# Patient Record
Sex: MALE | Race: WHITE | NOT HISPANIC OR LATINO | Employment: OTHER | URBAN - METROPOLITAN AREA
[De-identification: names, ages, dates, MRNs, and addresses within clinical notes are randomized per-mention and may not be internally consistent; named-entity substitution may affect disease eponyms.]

---

## 2018-09-18 ENCOUNTER — CONVERSION ENCOUNTER (OUTPATIENT)
Dept: PAIN MEDICINE | Facility: CLINIC | Age: 58
End: 2018-09-18

## 2018-09-18 ENCOUNTER — HOSPITAL ENCOUNTER (OUTPATIENT)
Dept: PAIN MEDICINE | Facility: HOSPITAL | Age: 58
Discharge: HOME | End: 2018-09-18
Attending: PHYSICAL MEDICINE & REHABILITATION | Admitting: PHYSICAL MEDICINE & REHABILITATION

## 2018-09-18 LAB
AMPHETAMINES UR QL SCN: NEGATIVE
BARBITURATES UR QL SCN: NEGATIVE
BENZODIAZ UR QL SCN: NEGATIVE
BZE UR QL SCN: NEGATIVE
CREAT 24H UR-MCNC: NORMAL MG/DL
METHADONE UR QL SCN: NEGATIVE
OPIATE CONFIRMATION URINE: NORMAL
OPIATES TESTED UR SCN: NEGATIVE
PCP UR QL: NEGATIVE
THC SERPLBLD CFM-MCNC: NEGATIVE NG/ML

## 2018-10-18 ENCOUNTER — HOSPITAL ENCOUNTER (OUTPATIENT)
Dept: PAIN MEDICINE | Facility: HOSPITAL | Age: 58
Discharge: HOME | End: 2018-10-18
Attending: PHYSICAL MEDICINE & REHABILITATION | Admitting: PHYSICAL MEDICINE & REHABILITATION

## 2018-10-18 ENCOUNTER — CONVERSION ENCOUNTER (OUTPATIENT)
Dept: PAIN MEDICINE | Facility: CLINIC | Age: 58
End: 2018-10-18

## 2018-12-18 ENCOUNTER — HOSPITAL ENCOUNTER (OUTPATIENT)
Dept: PAIN MEDICINE | Facility: HOSPITAL | Age: 58
Discharge: HOME | End: 2018-12-18
Attending: PHYSICAL MEDICINE & REHABILITATION | Admitting: PHYSICAL MEDICINE & REHABILITATION

## 2018-12-18 ENCOUNTER — CONVERSION ENCOUNTER (OUTPATIENT)
Dept: PAIN MEDICINE | Facility: CLINIC | Age: 58
End: 2018-12-18

## 2019-03-19 ENCOUNTER — CONVERSION ENCOUNTER (OUTPATIENT)
Dept: PAIN MEDICINE | Facility: CLINIC | Age: 59
End: 2019-03-19

## 2019-03-19 ENCOUNTER — HOSPITAL ENCOUNTER (OUTPATIENT)
Dept: PAIN MEDICINE | Facility: HOSPITAL | Age: 59
Discharge: HOME | End: 2019-03-19
Attending: PHYSICAL MEDICINE & REHABILITATION | Admitting: PHYSICAL MEDICINE & REHABILITATION

## 2019-06-04 VITALS
RESPIRATION RATE: 16 BRPM | DIASTOLIC BLOOD PRESSURE: 71 MMHG | WEIGHT: 214 LBS | SYSTOLIC BLOOD PRESSURE: 111 MMHG | BODY MASS INDEX: 29.96 KG/M2 | RESPIRATION RATE: 16 BRPM | OXYGEN SATURATION: 98 % | OXYGEN SATURATION: 97 % | BODY MASS INDEX: 32.2 KG/M2 | WEIGHT: 224 LBS | HEIGHT: 71 IN | WEIGHT: 225 LBS | DIASTOLIC BLOOD PRESSURE: 75 MMHG | BODY MASS INDEX: 31.5 KG/M2 | DIASTOLIC BLOOD PRESSURE: 79 MMHG | SYSTOLIC BLOOD PRESSURE: 123 MMHG | OXYGEN SATURATION: 96 % | HEART RATE: 69 BPM | OXYGEN SATURATION: 98 % | RESPIRATION RATE: 16 BRPM | HEART RATE: 62 BPM | DIASTOLIC BLOOD PRESSURE: 35 MMHG | HEIGHT: 71 IN | RESPIRATION RATE: 16 BRPM | HEIGHT: 71 IN | WEIGHT: 230 LBS | SYSTOLIC BLOOD PRESSURE: 91 MMHG | BODY MASS INDEX: 31.36 KG/M2 | HEART RATE: 78 BPM | SYSTOLIC BLOOD PRESSURE: 103 MMHG | HEIGHT: 71 IN | HEART RATE: 67 BPM

## 2019-06-18 ENCOUNTER — OFFICE VISIT (OUTPATIENT)
Dept: PAIN MEDICINE | Facility: CLINIC | Age: 59
End: 2019-06-18

## 2019-06-18 VITALS
TEMPERATURE: 97.6 F | HEIGHT: 70 IN | HEART RATE: 75 BPM | DIASTOLIC BLOOD PRESSURE: 92 MMHG | RESPIRATION RATE: 16 BRPM | OXYGEN SATURATION: 97 % | WEIGHT: 230 LBS | BODY MASS INDEX: 32.93 KG/M2 | SYSTOLIC BLOOD PRESSURE: 133 MMHG

## 2019-06-18 DIAGNOSIS — M54.2 NECK PAIN, CHRONIC: ICD-10-CM

## 2019-06-18 DIAGNOSIS — G89.29 CHRONIC LEFT SHOULDER PAIN: ICD-10-CM

## 2019-06-18 DIAGNOSIS — G89.29 CHRONIC MIDLINE LOW BACK PAIN WITHOUT SCIATICA: Primary | ICD-10-CM

## 2019-06-18 DIAGNOSIS — G89.29 NECK PAIN, CHRONIC: ICD-10-CM

## 2019-06-18 DIAGNOSIS — M25.512 CHRONIC LEFT SHOULDER PAIN: ICD-10-CM

## 2019-06-18 DIAGNOSIS — Z79.899 OTHER LONG TERM (CURRENT) DRUG THERAPY: ICD-10-CM

## 2019-06-18 DIAGNOSIS — M54.50 CHRONIC MIDLINE LOW BACK PAIN WITHOUT SCIATICA: Primary | ICD-10-CM

## 2019-06-18 PROCEDURE — G0463 HOSPITAL OUTPT CLINIC VISIT: HCPCS | Performed by: PHYSICAL MEDICINE & REHABILITATION

## 2019-06-18 PROCEDURE — 99214 OFFICE O/P EST MOD 30 MIN: CPT | Performed by: PHYSICAL MEDICINE & REHABILITATION

## 2019-06-18 RX ORDER — ASPIRIN 81 MG/1
81 TABLET ORAL DAILY
COMMUNITY
End: 2019-08-20 | Stop reason: ALTCHOICE

## 2019-06-18 RX ORDER — ATORVASTATIN CALCIUM 80 MG/1
80 TABLET, FILM COATED ORAL DAILY
Refills: 5 | COMMUNITY
Start: 2019-03-14

## 2019-06-18 RX ORDER — FLUOXETINE HYDROCHLORIDE 20 MG/1
20 CAPSULE ORAL
COMMUNITY

## 2019-06-18 RX ORDER — PREGABALIN 150 MG/1
75 CAPSULE ORAL
COMMUNITY

## 2019-06-18 RX ORDER — LEVOTHYROXINE SODIUM 175 UG/1
TABLET ORAL EVERY 24 HOURS
COMMUNITY
Start: 2014-06-13

## 2019-06-18 RX ORDER — NITROGLYCERIN 0.1MG/HR
PATCH, TRANSDERMAL 24 HOURS TRANSDERMAL
COMMUNITY
Start: 2018-09-18 | End: 2019-08-20 | Stop reason: ALTCHOICE

## 2019-06-18 RX ORDER — HYDROCODONE BITARTRATE AND ACETAMINOPHEN 7.5; 325 MG/1; MG/1
1 TABLET ORAL EVERY 6 HOURS PRN
Refills: 0 | COMMUNITY
Start: 2019-05-23 | End: 2019-08-20 | Stop reason: ALTCHOICE

## 2019-06-18 RX ORDER — AMLODIPINE BESYLATE 5 MG/1
5 TABLET ORAL DAILY
COMMUNITY

## 2019-06-18 RX ORDER — RANOLAZINE 1000 MG/1
TABLET, EXTENDED RELEASE ORAL
COMMUNITY
Start: 2018-09-18

## 2019-06-18 RX ORDER — TIZANIDINE 4 MG/1
4 TABLET ORAL 2 TIMES DAILY
Refills: 3 | COMMUNITY
Start: 2019-03-14

## 2019-06-18 RX ORDER — TRAZODONE HYDROCHLORIDE 100 MG/1
TABLET ORAL
Refills: 1 | COMMUNITY
Start: 2019-03-14

## 2019-06-18 NOTE — PROGRESS NOTES
Subjective   Khoa Hogan is a 58 y.o. male.     Pain in neck, back, L shoulder, 6/10 at worst, best, and today, stabbing, tingling, worse with all activity and lying down, interferes with sleep, failed PT, massage, ESIs, has TENS and LSO. Failed Norco 5/325mg, 7.5/325mg TID, was doing well at this dosage at QID prn. MRIs at [facility] in [place]. Prior notes with PCP reviewed, as above, also saw [facility], discharged in good standing, referred to continue pain management, saw Dr. Briggs in past. LBP worsening, no other change in symptoms.         The following portions of the patient's history were reviewed and updated as appropriate: allergies, current medications, past family history, past medical history, past social history, past surgical history and problem list.    Review of Systems   Constitutional: Negative for chills, fatigue and fever.   HENT: Positive for hearing loss.    Eyes: Positive for visual disturbance.   Respiratory: Positive for shortness of breath.    Cardiovascular: Positive for chest pain.   Gastrointestinal: Negative for abdominal pain, constipation, diarrhea, nausea and vomiting.   Genitourinary: Negative for urinary incontinence.   Musculoskeletal: Positive for arthralgias, back pain and neck pain. Negative for joint swelling and myalgias.   Neurological: Positive for dizziness and headache. Negative for weakness and numbness.       Objective   Physical Exam   Constitutional: He is oriented to person, place, and time. He appears well-developed and well-nourished.   HENT:   Head: Normocephalic and atraumatic.   Eyes: EOM are normal. Pupils are equal, round, and reactive to light.   Neck: Normal range of motion.   Cardiovascular: Normal rate, regular rhythm, normal heart sounds and intact distal pulses.   Pulmonary/Chest: Breath sounds normal.   Abdominal: Soft. Bowel sounds are normal. He exhibits no distension. There is no tenderness.   Neurological: He is alert and oriented to person, place,  and time. He has normal strength and normal reflexes. No sensory deficit.   Psychiatric: He has a normal mood and affect. His behavior is normal. Thought content normal.         Assessment/Plan   Khoa was seen today for back pain.    Diagnoses and all orders for this visit:    Chronic midline low back pain without sciatica    Neck pain, chronic    Chronic left shoulder pain    Other long term (current) drug therapy        INspect reviewed, in order. High risk per SOAPP. UDS 9/18/18 in order.  Restarted Norco 7.5/325mg QID prn, insufficient for worsening pain, increase to 10mg QID prn, may reduce next visit if possible. Failed TID prn, 5/325mg.  Cont LSO for truncal stability. Cont TENS, helps quite a bit.  Failed LESIs in past.  MRI L-spine with significant pathology.  RTC 2 months for f/u.

## 2019-08-20 ENCOUNTER — OFFICE VISIT (OUTPATIENT)
Dept: PAIN MEDICINE | Facility: CLINIC | Age: 59
End: 2019-08-20

## 2019-08-20 VITALS
OXYGEN SATURATION: 98 % | TEMPERATURE: 98.5 F | WEIGHT: 230 LBS | DIASTOLIC BLOOD PRESSURE: 78 MMHG | SYSTOLIC BLOOD PRESSURE: 116 MMHG | HEIGHT: 70 IN | HEART RATE: 82 BPM | BODY MASS INDEX: 32.93 KG/M2 | RESPIRATION RATE: 16 BRPM

## 2019-08-20 DIAGNOSIS — F19.90 CURRENT DRUG USE: Primary | ICD-10-CM

## 2019-08-20 DIAGNOSIS — M25.512 CHRONIC LEFT SHOULDER PAIN: ICD-10-CM

## 2019-08-20 DIAGNOSIS — G89.29 CHRONIC MIDLINE LOW BACK PAIN WITHOUT SCIATICA: Primary | ICD-10-CM

## 2019-08-20 DIAGNOSIS — M54.50 CHRONIC MIDLINE LOW BACK PAIN WITHOUT SCIATICA: Primary | ICD-10-CM

## 2019-08-20 DIAGNOSIS — G89.29 NECK PAIN, CHRONIC: ICD-10-CM

## 2019-08-20 DIAGNOSIS — G89.29 CHRONIC LEFT SHOULDER PAIN: ICD-10-CM

## 2019-08-20 DIAGNOSIS — Z79.899 OTHER LONG TERM (CURRENT) DRUG THERAPY: ICD-10-CM

## 2019-08-20 DIAGNOSIS — M54.2 NECK PAIN, CHRONIC: ICD-10-CM

## 2019-08-20 PROCEDURE — 99213 OFFICE O/P EST LOW 20 MIN: CPT | Performed by: PHYSICAL MEDICINE & REHABILITATION

## 2019-08-20 PROCEDURE — G0463 HOSPITAL OUTPT CLINIC VISIT: HCPCS | Performed by: PHYSICAL MEDICINE & REHABILITATION

## 2019-08-20 RX ORDER — ISOSORBIDE MONONITRATE 30 MG/1
30 TABLET, EXTENDED RELEASE ORAL
COMMUNITY

## 2019-08-20 RX ORDER — METOPROLOL SUCCINATE 50 MG/1
50 TABLET, EXTENDED RELEASE ORAL DAILY
COMMUNITY

## 2019-08-20 RX ORDER — CLOPIDOGREL BISULFATE 75 MG/1
75 TABLET ORAL DAILY
COMMUNITY

## 2019-08-20 RX ORDER — HYDROCODONE BITARTRATE AND ACETAMINOPHEN 10; 325 MG/1; MG/1
1 TABLET ORAL EVERY 6 HOURS PRN
Refills: 0 | COMMUNITY
Start: 2019-07-22 | End: 2019-08-20 | Stop reason: SDUPTHER

## 2019-08-20 RX ORDER — HYDROCODONE BITARTRATE AND ACETAMINOPHEN 10; 325 MG/1; MG/1
1 TABLET ORAL EVERY 6 HOURS PRN
Qty: 120 TABLET | Refills: 0 | Status: SHIPPED | OUTPATIENT
Start: 2019-08-20 | End: 2019-11-19 | Stop reason: SDUPTHER

## 2019-08-20 RX ORDER — HYDROCODONE BITARTRATE AND ACETAMINOPHEN 10; 325 MG/1; MG/1
1 TABLET ORAL EVERY 6 HOURS PRN
Qty: 120 TABLET | Refills: 0 | Status: SHIPPED | OUTPATIENT
Start: 2019-08-20 | End: 2019-08-20 | Stop reason: SDUPTHER

## 2019-08-20 RX ORDER — ACYCLOVIR 800 MG/1
TABLET ORAL
Refills: 0 | COMMUNITY
Start: 2019-07-10

## 2019-08-20 RX ORDER — ASPIRIN 325 MG
325 TABLET ORAL DAILY
COMMUNITY

## 2019-08-20 NOTE — PROGRESS NOTES
Subjective   Khoa Hogan is a 58 y.o. male.     Pain in neck, back, L shoulder, 6/10 at worst, best, and today, stabbing, tingling, worse with all activity and lying down, interferes with sleep, failed PT, massage, ESIs, has TENS and LSO. Failed Norco 5/325mg, 7.5/325mg TID, was doing well at this dosage at QID prn. MRIs at [facility] in [place]. Prior notes with PCP reviewed, as above, also saw [facility], discharged in good standing, referred to continue pain management, saw Dr. Briggs in past. LBP worsening, no other change in lumbar symptoms. Had 2 stents placed, used home meds inpt.         The following portions of the patient's history were reviewed and updated as appropriate: allergies, current medications, past family history, past medical history, past social history, past surgical history and problem list.    Review of Systems   Constitutional: Negative for chills, fatigue and fever.   HENT: Positive for hearing loss.    Eyes: Positive for visual disturbance.   Respiratory: Positive for shortness of breath.    Cardiovascular: Positive for chest pain.   Gastrointestinal: Negative for abdominal pain, constipation, diarrhea, nausea and vomiting.   Genitourinary: Negative for urinary incontinence.   Musculoskeletal: Positive for arthralgias, back pain and neck pain. Negative for joint swelling and myalgias.   Neurological: Positive for dizziness and headache. Negative for weakness and numbness.       Objective   Physical Exam   Constitutional: He is oriented to person, place, and time. He appears well-developed and well-nourished.   HENT:   Head: Normocephalic and atraumatic.   Eyes: EOM are normal. Pupils are equal, round, and reactive to light.   Neck: Normal range of motion.   Cardiovascular: Normal rate, regular rhythm, normal heart sounds and intact distal pulses.   Pulmonary/Chest: Breath sounds normal.   Abdominal: Soft. Bowel sounds are normal. He exhibits no distension. There is no tenderness.    Neurological: He is alert and oriented to person, place, and time. He has normal strength and normal reflexes. No sensory deficit.   Psychiatric: He has a normal mood and affect. His behavior is normal. Thought content normal.         Assessment/Plan   Khoa was seen today for back pain.    Diagnoses and all orders for this visit:    Chronic midline low back pain without sciatica    Neck pain, chronic    Chronic left shoulder pain    Other long term (current) drug therapy        INspect reviewed, in order. High risk per SOAPP. UDS 9/18/18 in order.  Restarted Norco 7.5/325mg QID prn, insufficient for worsening pain, increased to 10mg QID prn, continue for now. Failed TID prn, 5/325mg.  Cont LSO for truncal stability. Cont TENS, helps quite a bit.  Failed LESIs in past.  MRI L-spine with significant pathology.  RTC 3 months for f/u.

## 2019-11-19 ENCOUNTER — OFFICE VISIT (OUTPATIENT)
Dept: PAIN MEDICINE | Facility: CLINIC | Age: 59
End: 2019-11-19

## 2019-11-19 VITALS
WEIGHT: 237 LBS | DIASTOLIC BLOOD PRESSURE: 85 MMHG | RESPIRATION RATE: 16 BRPM | HEART RATE: 83 BPM | HEIGHT: 71 IN | SYSTOLIC BLOOD PRESSURE: 128 MMHG | TEMPERATURE: 97.8 F | BODY MASS INDEX: 33.18 KG/M2 | OXYGEN SATURATION: 97 %

## 2019-11-19 DIAGNOSIS — M54.2 NECK PAIN, CHRONIC: ICD-10-CM

## 2019-11-19 DIAGNOSIS — M54.50 CHRONIC MIDLINE LOW BACK PAIN WITHOUT SCIATICA: Primary | ICD-10-CM

## 2019-11-19 DIAGNOSIS — G89.29 NECK PAIN, CHRONIC: ICD-10-CM

## 2019-11-19 DIAGNOSIS — M25.512 CHRONIC LEFT SHOULDER PAIN: ICD-10-CM

## 2019-11-19 DIAGNOSIS — G89.29 CHRONIC LEFT SHOULDER PAIN: ICD-10-CM

## 2019-11-19 DIAGNOSIS — G89.29 CHRONIC MIDLINE LOW BACK PAIN WITHOUT SCIATICA: Primary | ICD-10-CM

## 2019-11-19 DIAGNOSIS — Z79.899 OTHER LONG TERM (CURRENT) DRUG THERAPY: ICD-10-CM

## 2019-11-19 PROCEDURE — G0463 HOSPITAL OUTPT CLINIC VISIT: HCPCS | Performed by: PHYSICAL MEDICINE & REHABILITATION

## 2019-11-19 PROCEDURE — 99213 OFFICE O/P EST LOW 20 MIN: CPT | Performed by: PHYSICAL MEDICINE & REHABILITATION

## 2019-11-19 RX ORDER — DEXLANSOPRAZOLE 60 MG/1
CAPSULE, DELAYED RELEASE ORAL
COMMUNITY

## 2019-11-19 RX ORDER — HYDROCODONE BITARTRATE AND ACETAMINOPHEN 10; 325 MG/1; MG/1
1 TABLET ORAL EVERY 6 HOURS PRN
Qty: 120 TABLET | Refills: 0 | Status: SHIPPED | OUTPATIENT
Start: 2019-11-19 | End: 2019-11-19 | Stop reason: SDUPTHER

## 2019-11-19 RX ORDER — TAMSULOSIN HYDROCHLORIDE 0.4 MG/1
CAPSULE ORAL
COMMUNITY
Start: 2012-07-11

## 2019-11-19 RX ORDER — HYDROCODONE BITARTRATE AND ACETAMINOPHEN 10; 325 MG/1; MG/1
1 TABLET ORAL EVERY 6 HOURS PRN
Qty: 120 TABLET | Refills: 0 | Status: SHIPPED | OUTPATIENT
Start: 2019-11-19 | End: 2020-02-11 | Stop reason: SDUPTHER

## 2019-11-19 RX ORDER — LEVOTHYROXINE SODIUM 200 MCG
200 TABLET ORAL DAILY
Refills: 1 | COMMUNITY
Start: 2019-09-03

## 2019-11-19 NOTE — PROGRESS NOTES
Subjective   Khoa Hogan is a 59 y.o. male.     Pain in neck, back, L shoulder, 6/10 at worst, best, and today, stabbing, tingling, worse with all activity and lying down, interferes with sleep, failed PT, massage, ESIs, has TENS and LSO. Failed Norco 5/325mg, 7.5/325mg TID, was doing well at this dosage at QID prn. MRIs at [facility] in [place]. Prior notes with PCP reviewed, as above, also saw [facility], discharged in good standing, referred to continue pain management, saw Dr. Briggs in past. LBP worsening, no other change in lumbar symptoms. Had 2 stents placed, used home meds inpt.         The following portions of the patient's history were reviewed and updated as appropriate: allergies, current medications, past family history, past medical history, past social history, past surgical history and problem list.    Review of Systems   Constitutional: Negative for chills, fatigue and fever.   HENT: Positive for hearing loss.    Eyes: Positive for visual disturbance.   Respiratory: Positive for shortness of breath.    Cardiovascular: Positive for chest pain.   Gastrointestinal: Negative for abdominal pain, constipation, diarrhea, nausea and vomiting.   Genitourinary: Negative for urinary incontinence.   Musculoskeletal: Positive for arthralgias, back pain and neck pain. Negative for joint swelling and myalgias.   Neurological: Positive for dizziness and headache. Negative for weakness and numbness.       Objective   Physical Exam   Constitutional: He is oriented to person, place, and time. He appears well-developed and well-nourished.   HENT:   Head: Normocephalic and atraumatic.   Eyes: EOM are normal. Pupils are equal, round, and reactive to light.   Neck: Normal range of motion.   Cardiovascular: Normal rate, regular rhythm, normal heart sounds and intact distal pulses.   Pulmonary/Chest: Breath sounds normal.   Abdominal: Soft. Bowel sounds are normal. He exhibits no distension. There is no tenderness.    Neurological: He is alert and oriented to person, place, and time. He has normal strength and normal reflexes. No sensory deficit.   Psychiatric: He has a normal mood and affect. His behavior is normal. Thought content normal.         Assessment/Plan   Khoa was seen today for back pain.    Diagnoses and all orders for this visit:    Chronic midline low back pain without sciatica    Neck pain, chronic    Chronic left shoulder pain    Other long term (current) drug therapy        INspect reviewed, in order. High risk per SOAPP. UDS 8/20/19 in order.  Restarted Norco 7.5/325mg QID prn, insufficient for worsening pain, increased to 10mg QID prn, continue for now. Failed TID prn, 5/325mg.  Cont LSO for truncal stability. Cont TENS, helps quite a bit.  Failed LESIs in past.  MRI L-spine with significant pathology.  RTC 3 months for f/u.

## 2020-02-11 ENCOUNTER — OFFICE VISIT (OUTPATIENT)
Dept: PAIN MEDICINE | Facility: CLINIC | Age: 60
End: 2020-02-11

## 2020-02-11 VITALS
SYSTOLIC BLOOD PRESSURE: 143 MMHG | BODY MASS INDEX: 34.16 KG/M2 | OXYGEN SATURATION: 98 % | RESPIRATION RATE: 16 BRPM | WEIGHT: 244 LBS | TEMPERATURE: 97.9 F | HEIGHT: 71 IN | HEART RATE: 74 BPM | DIASTOLIC BLOOD PRESSURE: 84 MMHG

## 2020-02-11 DIAGNOSIS — G89.29 CHRONIC LEFT SHOULDER PAIN: ICD-10-CM

## 2020-02-11 DIAGNOSIS — M54.2 NECK PAIN, CHRONIC: ICD-10-CM

## 2020-02-11 DIAGNOSIS — M54.50 CHRONIC MIDLINE LOW BACK PAIN WITHOUT SCIATICA: Primary | ICD-10-CM

## 2020-02-11 DIAGNOSIS — G89.29 CHRONIC MIDLINE LOW BACK PAIN WITHOUT SCIATICA: Primary | ICD-10-CM

## 2020-02-11 DIAGNOSIS — Z79.899 OTHER LONG TERM (CURRENT) DRUG THERAPY: ICD-10-CM

## 2020-02-11 DIAGNOSIS — G89.29 NECK PAIN, CHRONIC: ICD-10-CM

## 2020-02-11 DIAGNOSIS — M25.512 CHRONIC LEFT SHOULDER PAIN: ICD-10-CM

## 2020-02-11 PROCEDURE — G0463 HOSPITAL OUTPT CLINIC VISIT: HCPCS | Performed by: PHYSICAL MEDICINE & REHABILITATION

## 2020-02-11 PROCEDURE — 99213 OFFICE O/P EST LOW 20 MIN: CPT | Performed by: PHYSICAL MEDICINE & REHABILITATION

## 2020-02-11 RX ORDER — HYDROCODONE BITARTRATE AND ACETAMINOPHEN 10; 325 MG/1; MG/1
1 TABLET ORAL EVERY 6 HOURS PRN
Qty: 120 TABLET | Refills: 0 | Status: SHIPPED | OUTPATIENT
Start: 2020-02-11 | End: 2020-05-14 | Stop reason: SDUPTHER

## 2020-02-11 RX ORDER — NITROGLYCERIN 2.5 MG/1
CAPSULE ORAL
COMMUNITY

## 2020-02-11 RX ORDER — HYDROCODONE BITARTRATE AND ACETAMINOPHEN 10; 325 MG/1; MG/1
1 TABLET ORAL EVERY 6 HOURS PRN
Qty: 120 TABLET | Refills: 0 | Status: SHIPPED | OUTPATIENT
Start: 2020-02-11 | End: 2020-02-11 | Stop reason: SDUPTHER

## 2020-02-11 NOTE — PROGRESS NOTES
Subjective   Khoa Hogna Jr. is a 59 y.o. male.     Pain in neck, back, L shoulder, 6/10 at worst, best, and today, stabbing, tingling, worse with all activity and lying down, interferes with sleep, failed PT, massage, ESIs, has TENS and LSO. Failed Norco 5/325mg, 7.5/325mg TID, was doing well at this dosage at QID prn. MRIs at [facility] in [place]. Prior notes with PCP reviewed, as above, also saw [facility], discharged in good standing, referred to continue pain management, saw Dr. Briggs in past. LBP worsening, no other change in lumbar symptoms. Had 2 stents placed, used home meds inpt.       The following portions of the patient's history were reviewed and updated as appropriate: allergies, current medications, past family history, past medical history, past social history, past surgical history and problem list.    Review of Systems   Constitutional: Negative for chills, fatigue and fever.   HENT: Positive for hearing loss.    Eyes: Positive for visual disturbance.   Respiratory: Positive for shortness of breath.    Cardiovascular: Positive for chest pain.   Gastrointestinal: Negative for abdominal pain, constipation, diarrhea, nausea and vomiting.   Genitourinary: Negative for urinary incontinence.   Musculoskeletal: Positive for arthralgias, back pain and neck pain. Negative for joint swelling and myalgias.   Neurological: Positive for dizziness and headache. Negative for weakness and numbness.       Objective   Physical Exam   Constitutional: He is oriented to person, place, and time. He appears well-developed and well-nourished.   HENT:   Head: Normocephalic and atraumatic.   Eyes: Pupils are equal, round, and reactive to light. EOM are normal.   Neck: Normal range of motion.   Cardiovascular: Normal rate, regular rhythm, normal heart sounds and intact distal pulses.   Pulmonary/Chest: Breath sounds normal.   Abdominal: Soft. Bowel sounds are normal. He exhibits no distension. There is no tenderness.    Neurological: He is alert and oriented to person, place, and time. He has normal strength and normal reflexes. No sensory deficit.   Psychiatric: He has a normal mood and affect. His behavior is normal. Thought content normal.         Assessment/Plan   Khoa was seen today for back pain.    Diagnoses and all orders for this visit:    Chronic midline low back pain without sciatica    Neck pain, chronic    Chronic left shoulder pain    Other long term (current) drug therapy        INspect reviewed, in order. High risk per SOAPP. UDS 8/20/19 in order.  Restarted Norco 7.5/325mg QID prn, insufficient for worsening pain, increased to 10mg QID prn, continue for now. Failed 10mg TID prn, 5/325mg.  Cont LSO for truncal stability. Cont TENS, helps quite a bit.  Failed LESIs in past.  MRI L-spine with significant pathology.  RTC 3 months for f/u.

## 2020-05-14 ENCOUNTER — OFFICE VISIT (OUTPATIENT)
Dept: PAIN MEDICINE | Facility: CLINIC | Age: 60
End: 2020-05-14

## 2020-05-14 VITALS
DIASTOLIC BLOOD PRESSURE: 59 MMHG | BODY MASS INDEX: 35.07 KG/M2 | HEART RATE: 64 BPM | WEIGHT: 245 LBS | HEIGHT: 70 IN | TEMPERATURE: 97.1 F | SYSTOLIC BLOOD PRESSURE: 93 MMHG | RESPIRATION RATE: 16 BRPM | OXYGEN SATURATION: 98 %

## 2020-05-14 DIAGNOSIS — M25.512 CHRONIC LEFT SHOULDER PAIN: ICD-10-CM

## 2020-05-14 DIAGNOSIS — Z79.899 OTHER LONG TERM (CURRENT) DRUG THERAPY: ICD-10-CM

## 2020-05-14 DIAGNOSIS — G89.29 NECK PAIN, CHRONIC: ICD-10-CM

## 2020-05-14 DIAGNOSIS — G89.29 CHRONIC MIDLINE LOW BACK PAIN WITHOUT SCIATICA: Primary | ICD-10-CM

## 2020-05-14 DIAGNOSIS — M54.2 NECK PAIN, CHRONIC: ICD-10-CM

## 2020-05-14 DIAGNOSIS — M54.50 CHRONIC MIDLINE LOW BACK PAIN WITHOUT SCIATICA: Primary | ICD-10-CM

## 2020-05-14 DIAGNOSIS — G89.29 CHRONIC LEFT SHOULDER PAIN: ICD-10-CM

## 2020-05-14 PROCEDURE — G0463 HOSPITAL OUTPT CLINIC VISIT: HCPCS | Performed by: PHYSICAL MEDICINE & REHABILITATION

## 2020-05-14 PROCEDURE — 99213 OFFICE O/P EST LOW 20 MIN: CPT | Performed by: PHYSICAL MEDICINE & REHABILITATION

## 2020-05-14 RX ORDER — HYDROCODONE BITARTRATE AND ACETAMINOPHEN 10; 325 MG/1; MG/1
1 TABLET ORAL EVERY 6 HOURS PRN
Qty: 120 TABLET | Refills: 0 | Status: SHIPPED | OUTPATIENT
Start: 2020-05-14 | End: 2020-08-13 | Stop reason: SDUPTHER

## 2020-05-14 RX ORDER — HYDROCODONE BITARTRATE AND ACETAMINOPHEN 10; 325 MG/1; MG/1
1 TABLET ORAL EVERY 6 HOURS PRN
Qty: 120 TABLET | Refills: 0 | Status: SHIPPED | OUTPATIENT
Start: 2020-05-14 | End: 2020-05-14 | Stop reason: SDUPTHER

## 2020-05-14 RX ORDER — CEPHALEXIN 500 MG/1
500 CAPSULE ORAL 2 TIMES DAILY
COMMUNITY
Start: 2020-03-17

## 2020-05-14 RX ORDER — CYCLOBENZAPRINE HCL 10 MG
10 TABLET ORAL 3 TIMES DAILY PRN
COMMUNITY
Start: 2020-03-19

## 2020-05-14 NOTE — PROGRESS NOTES
Subjective   Khoa Hogan Jr. is a 59 y.o. male.     Pain in neck, back, L shoulder, 6/10 at worst, best, and today, stabbing, tingling, worse with all activity and lying down, interferes with sleep, failed PT, massage, ESIs, has TENS and LSO. Failed Norco 5/325mg, 7.5/325mg TID, was doing well at this dosage at QID prn. MRIs at [facility] in [place]. Prior notes with PCP reviewed, as above, also saw [facility], discharged in good standing, referred to continue pain management, saw Dr. Briggs in past. LBP worsening, no other change in lumbar symptoms. Had 2 stents placed, used home meds inpt.       The following portions of the patient's history were reviewed and updated as appropriate: allergies, current medications, past family history, past medical history, past social history, past surgical history and problem list.    Review of Systems   Constitutional: Negative for chills, fatigue and fever.   HENT: Positive for hearing loss.    Eyes: Positive for visual disturbance.   Respiratory: Positive for shortness of breath.    Cardiovascular: Positive for chest pain.   Gastrointestinal: Negative for abdominal pain, constipation, diarrhea, nausea and vomiting.   Genitourinary: Negative for urinary incontinence.   Musculoskeletal: Positive for arthralgias, back pain and neck pain. Negative for joint swelling and myalgias.   Neurological: Positive for dizziness and headache. Negative for weakness and numbness.       Objective   Physical Exam   Constitutional: He is oriented to person, place, and time. He appears well-developed and well-nourished.   HENT:   Head: Normocephalic and atraumatic.   Eyes: Pupils are equal, round, and reactive to light. EOM are normal.   Neck: Normal range of motion.   Cardiovascular: Normal rate, regular rhythm, normal heart sounds and intact distal pulses.   Pulmonary/Chest: Breath sounds normal.   Abdominal: Soft. Bowel sounds are normal. He exhibits no distension. There is no tenderness.    Neurological: He is alert and oriented to person, place, and time. He has normal strength and normal reflexes. No sensory deficit.   Psychiatric: He has a normal mood and affect. His behavior is normal. Thought content normal.         Assessment/Plan   Khoa was seen today for back pain.    Diagnoses and all orders for this visit:    Chronic midline low back pain without sciatica    Neck pain, chronic    Chronic left shoulder pain    Other long term (current) drug therapy        INspect reviewed, in order. High risk per SOAPP. UDS 8/20/19 in order.  Restarted Norco 7.5/325mg QID prn, insufficient for worsening pain, increased to 10mg QID prn, continue for now. Failed 10mg TID prn, 5/325mg. Doing well, denies side effects.  Cont LSO for truncal stability. Cont TENS, helps quite a bit.  Failed LESIs in past.  MRI L-spine with significant pathology.  RTC 3 months for f/u.

## 2020-08-13 ENCOUNTER — OFFICE VISIT (OUTPATIENT)
Dept: PAIN MEDICINE | Facility: CLINIC | Age: 60
End: 2020-08-13

## 2020-08-13 ENCOUNTER — RESULTS ENCOUNTER (OUTPATIENT)
Dept: PAIN MEDICINE | Facility: CLINIC | Age: 60
End: 2020-08-13

## 2020-08-13 VITALS
OXYGEN SATURATION: 98 % | TEMPERATURE: 98 F | WEIGHT: 230 LBS | DIASTOLIC BLOOD PRESSURE: 80 MMHG | RESPIRATION RATE: 16 BRPM | BODY MASS INDEX: 32.93 KG/M2 | SYSTOLIC BLOOD PRESSURE: 120 MMHG | HEIGHT: 70 IN | HEART RATE: 96 BPM

## 2020-08-13 DIAGNOSIS — M54.50 CHRONIC MIDLINE LOW BACK PAIN WITHOUT SCIATICA: Primary | ICD-10-CM

## 2020-08-13 DIAGNOSIS — F19.90 CURRENT DRUG USE: ICD-10-CM

## 2020-08-13 DIAGNOSIS — M25.512 CHRONIC LEFT SHOULDER PAIN: ICD-10-CM

## 2020-08-13 DIAGNOSIS — Z79.899 OTHER LONG TERM (CURRENT) DRUG THERAPY: ICD-10-CM

## 2020-08-13 DIAGNOSIS — G89.29 NECK PAIN, CHRONIC: ICD-10-CM

## 2020-08-13 DIAGNOSIS — M54.2 NECK PAIN, CHRONIC: ICD-10-CM

## 2020-08-13 DIAGNOSIS — G89.29 CHRONIC LEFT SHOULDER PAIN: ICD-10-CM

## 2020-08-13 DIAGNOSIS — G89.29 CHRONIC MIDLINE LOW BACK PAIN WITHOUT SCIATICA: Primary | ICD-10-CM

## 2020-08-13 DIAGNOSIS — F19.90 CURRENT DRUG USE: Primary | ICD-10-CM

## 2020-08-13 PROCEDURE — 99213 OFFICE O/P EST LOW 20 MIN: CPT | Performed by: PHYSICAL MEDICINE & REHABILITATION

## 2020-08-13 PROCEDURE — G0463 HOSPITAL OUTPT CLINIC VISIT: HCPCS | Performed by: PHYSICAL MEDICINE & REHABILITATION

## 2020-08-13 RX ORDER — HYDROCODONE BITARTRATE AND ACETAMINOPHEN 10; 325 MG/1; MG/1
1 TABLET ORAL EVERY 6 HOURS PRN
Qty: 120 TABLET | Refills: 0 | Status: SHIPPED | OUTPATIENT
Start: 2020-08-13 | End: 2020-08-13 | Stop reason: SDUPTHER

## 2020-08-13 RX ORDER — DOXYCYCLINE HYCLATE 100 MG/1
CAPSULE ORAL
COMMUNITY

## 2020-08-13 RX ORDER — HYDROCODONE BITARTRATE AND ACETAMINOPHEN 10; 325 MG/1; MG/1
1 TABLET ORAL EVERY 6 HOURS PRN
Qty: 120 TABLET | Refills: 0 | Status: SHIPPED | OUTPATIENT
Start: 2020-08-13

## 2020-08-13 NOTE — PROGRESS NOTES
Subjective   Khoa Hogan Jr. is a 59 y.o. male.     Pain in neck, back, L shoulder, 6/10 at worst, best, and today, stabbing, tingling, worse with all activity and lying down, interferes with sleep, failed PT, massage, ESIs, has TENS and LSO. Failed Norco 5/325mg, 7.5/325mg TID, was doing well at this dosage at QID prn. MRIs at [facility] in [place]. Prior notes with PCP reviewed, as above, also saw [facility], discharged in good standing, referred to continue pain management, saw Dr. Briggs in past. LBP worsening, no other change in lumbar symptoms. Had 2 stents placed, used home meds inpt.       The following portions of the patient's history were reviewed and updated as appropriate: allergies, current medications, past family history, past medical history, past social history, past surgical history and problem list.    Review of Systems   Constitutional: Negative for chills, fatigue and fever.   HENT: Positive for hearing loss.    Eyes: Positive for visual disturbance.   Respiratory: Positive for shortness of breath.    Cardiovascular: Positive for chest pain.   Gastrointestinal: Negative for abdominal pain, constipation, diarrhea, nausea and vomiting.   Genitourinary: Negative for urinary incontinence.   Musculoskeletal: Positive for arthralgias, back pain and neck pain. Negative for joint swelling and myalgias.   Neurological: Positive for dizziness and headache. Negative for weakness and numbness.       Objective   Physical Exam   Constitutional: He is oriented to person, place, and time. He appears well-developed and well-nourished.   HENT:   Head: Normocephalic and atraumatic.   Eyes: Pupils are equal, round, and reactive to light. EOM are normal.   Neck: Normal range of motion.   Cardiovascular: Normal rate, regular rhythm, normal heart sounds and intact distal pulses.   Pulmonary/Chest: Breath sounds normal.   Abdominal: Soft. Bowel sounds are normal. He exhibits no distension. There is no tenderness.    Neurological: He is alert and oriented to person, place, and time. He has normal strength and normal reflexes. No sensory deficit.   Psychiatric: He has a normal mood and affect. His behavior is normal. Thought content normal.         Assessment/Plan   Khoa was seen today for back pain.    Diagnoses and all orders for this visit:    Chronic midline low back pain without sciatica    Neck pain, chronic    Chronic left shoulder pain    Other long term (current) drug therapy        INspect reviewed, in order. High risk per SOAPP. UDS 8/20/19 in order.  Restarted Norco 7.5/325mg QID prn, insufficient for worsening pain, increased to 10mg QID prn, continue for now. Failed 10mg TID prn, 5/325mg. Doing well, denies side effects.  Cont LSO for truncal stability. Cont TENS, helps quite a bit.  Failed LESIs in past.  MRI L-spine with significant pathology.  RTC 3 months for f/u.

## 2020-08-26 ENCOUNTER — TELEPHONE (OUTPATIENT)
Dept: PAIN MEDICINE | Facility: HOSPITAL | Age: 60
End: 2020-08-26

## 2020-09-25 NOTE — TELEPHONE ENCOUNTER
Called patient for pill count. He returned call and stated he is out of town in [place]. Would you like for him to be placed on another day for unannounced pill count?

## 2020-09-25 NOTE — TELEPHONE ENCOUNTER
Spoke to Dr Sanders-- he is to come in on another day since he is in [facility] and we are about to close--- He is coming in on Tuesday am 9/29 for pill ct now-- pt informed( pt stated on Friday he had around 80 pills on 9/25/20 fyi)

## 2020-09-25 NOTE — TELEPHONE ENCOUNTER
No, please ask him to have his pills counted by a pharmacy there with the amount called in, thanks.

## 2020-09-29 NOTE — TELEPHONE ENCOUNTER
He showed for a pill count with only 19 Hydrocodone left in his bottle that he had #120 filled on 09/14. He stated he thought he might have 15 at home. Pills counted per NASIM Mann LPN and MELINDA Hsieh.